# Patient Record
Sex: FEMALE | Race: WHITE | HISPANIC OR LATINO | URBAN - METROPOLITAN AREA
[De-identification: names, ages, dates, MRNs, and addresses within clinical notes are randomized per-mention and may not be internally consistent; named-entity substitution may affect disease eponyms.]

---

## 2017-04-07 ENCOUNTER — EMERGENCY (EMERGENCY)
Facility: HOSPITAL | Age: 47
LOS: 1 days | Discharge: HOME | End: 2017-04-07
Attending: EMERGENCY MEDICINE | Admitting: EMERGENCY MEDICINE
Payer: COMMERCIAL

## 2017-04-07 VITALS
HEART RATE: 90 BPM | WEIGHT: 147.05 LBS | DIASTOLIC BLOOD PRESSURE: 74 MMHG | RESPIRATION RATE: 14 BRPM | SYSTOLIC BLOOD PRESSURE: 127 MMHG | TEMPERATURE: 98 F | OXYGEN SATURATION: 100 %

## 2017-04-07 VITALS
OXYGEN SATURATION: 99 % | HEART RATE: 86 BPM | SYSTOLIC BLOOD PRESSURE: 130 MMHG | TEMPERATURE: 98 F | RESPIRATION RATE: 16 BRPM | DIASTOLIC BLOOD PRESSURE: 56 MMHG

## 2017-04-07 DIAGNOSIS — E03.9 HYPOTHYROIDISM, UNSPECIFIED: ICD-10-CM

## 2017-04-07 DIAGNOSIS — H92.03 OTALGIA, BILATERAL: ICD-10-CM

## 2017-04-07 DIAGNOSIS — Z87.440 PERSONAL HISTORY OF URINARY (TRACT) INFECTIONS: ICD-10-CM

## 2017-04-07 PROCEDURE — 70450 CT HEAD/BRAIN W/O DYE: CPT | Mod: 26

## 2017-04-07 PROCEDURE — 99284 EMERGENCY DEPT VISIT MOD MDM: CPT | Mod: 25

## 2017-04-07 PROCEDURE — 70486 CT MAXILLOFACIAL W/O DYE: CPT | Mod: 26

## 2017-04-07 PROCEDURE — 70450 CT HEAD/BRAIN W/O DYE: CPT

## 2017-04-07 PROCEDURE — 99284 EMERGENCY DEPT VISIT MOD MDM: CPT

## 2017-04-07 PROCEDURE — 70486 CT MAXILLOFACIAL W/O DYE: CPT

## 2017-04-07 RX ORDER — TRAMADOL HYDROCHLORIDE 50 MG/1
50 TABLET ORAL ONCE
Qty: 0 | Refills: 0 | Status: DISCONTINUED | OUTPATIENT
Start: 2017-04-07 | End: 2017-04-07

## 2017-04-07 RX ORDER — TRAMADOL HYDROCHLORIDE 50 MG/1
2 TABLET ORAL
Qty: 24 | Refills: 0 | OUTPATIENT
Start: 2017-04-07 | End: 2017-04-10

## 2017-04-07 RX ADMIN — TRAMADOL HYDROCHLORIDE 50 MILLIGRAM(S): 50 TABLET ORAL at 18:55

## 2017-04-07 NOTE — ED ADULT NURSE NOTE - OBJECTIVE STATEMENT
Presents to ER w c/o bilat ear pain x 4 weeks.  Pt states she developed the pain after inhaling bleach fumes.  Pt has been to many doctors and has been placed on steroids and antibx ear drops but states the pain never went away

## 2017-04-07 NOTE — ED PROVIDER NOTE - ENMT, MLM
Airway patent, Nasal mucosa clear. Mouth with normal mucosa. Throat has no vesicles, no oropharyngeal exudates and uvula is midline. tmj non tender Airway patent, Nasal mucosa clear. Mouth with normal mucosa. Throat has no vesicles, no oropharyngeal exudates and uvula is midline. tmj non tender. dentition wnl, no ttp or caries

## 2017-04-07 NOTE — ED PROVIDER NOTE - MEDICAL DECISION MAKING DETAILS
chronic b/l ear pain x 1 month failed multiple tx, seen by ent, ct with posterior superior impacted wisdom teeth ? source.  tramadol, fu ent to ? if teeth may be causing pain, if so, fu omfs for dental extraction.

## 2017-04-07 NOTE — ED PROVIDER NOTE - OBJECTIVE STATEMENT
Pt is a 45 yo female co bl ear pain for over a month. pt has been to ent and tx with abx without relief but sx persist. pt states began after exposure to clorox bleech. no hearing change drainage, ha. pt states pain at tmj region b/l as well

## 2017-04-07 NOTE — ED ADULT NURSE REASSESSMENT NOTE - NS ED NURSE REASSESS COMMENT FT1
46 year old female received from [facility]. Pt A+O x 4. Pt presented to the ED with chronic and persistent ear pain that radiated down the head/neck. Pt resting but seems slightly anxious. Awaiting CT/radiology.

## 2018-02-11 ENCOUNTER — EMERGENCY (EMERGENCY)
Facility: HOSPITAL | Age: 48
LOS: 1 days | Discharge: HOME | End: 2018-02-11
Attending: EMERGENCY MEDICINE | Admitting: EMERGENCY MEDICINE
Payer: MEDICAID

## 2018-02-11 VITALS
TEMPERATURE: 98 F | HEIGHT: 60 IN | HEART RATE: 87 BPM | SYSTOLIC BLOOD PRESSURE: 132 MMHG | WEIGHT: 149.03 LBS | RESPIRATION RATE: 16 BRPM | DIASTOLIC BLOOD PRESSURE: 83 MMHG | OXYGEN SATURATION: 99 %

## 2018-02-11 VITALS
HEART RATE: 75 BPM | OXYGEN SATURATION: 99 % | DIASTOLIC BLOOD PRESSURE: 70 MMHG | TEMPERATURE: 98 F | RESPIRATION RATE: 15 BRPM | SYSTOLIC BLOOD PRESSURE: 125 MMHG

## 2018-02-11 PROCEDURE — 96372 THER/PROPH/DIAG INJ SC/IM: CPT

## 2018-02-11 PROCEDURE — 72100 X-RAY EXAM L-S SPINE 2/3 VWS: CPT

## 2018-02-11 PROCEDURE — 99283 EMERGENCY DEPT VISIT LOW MDM: CPT | Mod: 25

## 2018-02-11 PROCEDURE — 99283 EMERGENCY DEPT VISIT LOW MDM: CPT

## 2018-02-11 PROCEDURE — 72100 X-RAY EXAM L-S SPINE 2/3 VWS: CPT | Mod: 26

## 2018-02-11 RX ORDER — LEVOTHYROXINE SODIUM 125 MCG
0 TABLET ORAL
Qty: 0 | Refills: 0 | COMMUNITY

## 2018-02-11 RX ORDER — OMEPRAZOLE 10 MG/1
0 CAPSULE, DELAYED RELEASE ORAL
Qty: 0 | Refills: 0 | COMMUNITY

## 2018-02-11 RX ORDER — LIDOCAINE 4 G/100G
1 CREAM TOPICAL ONCE
Qty: 0 | Refills: 0 | Status: COMPLETED | OUTPATIENT
Start: 2018-02-11 | End: 2018-02-11

## 2018-02-11 RX ORDER — CYCLOBENZAPRINE HYDROCHLORIDE 10 MG/1
1 TABLET, FILM COATED ORAL
Qty: 9 | Refills: 0 | OUTPATIENT
Start: 2018-02-11 | End: 2018-02-13

## 2018-02-11 RX ORDER — KETOROLAC TROMETHAMINE 30 MG/ML
60 SYRINGE (ML) INJECTION ONCE
Qty: 0 | Refills: 0 | Status: DISCONTINUED | OUTPATIENT
Start: 2018-02-11 | End: 2018-02-11

## 2018-02-11 RX ADMIN — Medication 60 MILLIGRAM(S): at 20:07

## 2018-02-11 RX ADMIN — Medication 60 MILLIGRAM(S): at 19:51

## 2018-02-11 RX ADMIN — LIDOCAINE 1 PATCH: 4 CREAM TOPICAL at 19:51

## 2018-02-11 NOTE — ED PROVIDER NOTE - PROGRESS NOTE DETAILS
patient resting comfortably, rx for flexeril and naproxen sent to pharmacy, given information for Dr Ambrocio,  xray lumbar spine normal.  recommended otc pain patches, ex, salon pas or [facility]ac[facility] for back pain

## 2018-02-11 NOTE — ED PROVIDER NOTE - OBJECTIVE STATEMENT
46 yo female presents with lower back pain radiating down right leg x 3 weeks, no trauma.  states 2-3 years ago she had a fall where she landed on her buttocks,  denies hx of fx.  nonsmoker. states she works as a , stands many hours a day.    PMD Dr Brito

## 2018-02-11 NOTE — ED PROVIDER NOTE - CHPI ED SYMPTOMS NEG
no motor function loss/no bowel dysfunction/no difficulty bearing weight/no bladder dysfunction/no neck tenderness/no numbness

## 2018-02-11 NOTE — ED ADULT NURSE NOTE - CHPI ED SYMPTOMS NEG
no difficulty bearing weight/no fever/no numbness/no deformity/no bruising/no stiffness/no tingling/no abrasion

## 2018-02-11 NOTE — ED PROVIDER NOTE - ATTENDING CONTRIBUTION TO CARE
I have personally performed a face to face diagnostic evaluation on this patient.  I have reviewed the PA note and agree with the history, exam, and plan of care, except as noted.  History and Exam by me shows 47 female c/o right sided low back pain, radiating to right thigh, for past 2-3 weeks, taking advil with little relief, denies fever, no bowel or bladder dysfunction, no weakness, (+)straight leg test of right leg with 30 degrees, able to ambulate, f/u xrays, pain control, f/u with orthopedics.

## 2018-02-26 ENCOUNTER — EMERGENCY (EMERGENCY)
Facility: HOSPITAL | Age: 48
LOS: 1 days | Discharge: HOME | End: 2018-02-26
Attending: EMERGENCY MEDICINE
Payer: MEDICAID

## 2018-02-26 VITALS
DIASTOLIC BLOOD PRESSURE: 77 MMHG | SYSTOLIC BLOOD PRESSURE: 118 MMHG | OXYGEN SATURATION: 98 % | RESPIRATION RATE: 18 BRPM | HEART RATE: 90 BPM | WEIGHT: 154.32 LBS | TEMPERATURE: 99 F

## 2018-02-26 PROCEDURE — 72131 CT LUMBAR SPINE W/O DYE: CPT | Mod: 26

## 2018-02-26 PROCEDURE — 99284 EMERGENCY DEPT VISIT MOD MDM: CPT | Mod: 25

## 2018-02-26 PROCEDURE — 72131 CT LUMBAR SPINE W/O DYE: CPT

## 2018-02-26 PROCEDURE — 99284 EMERGENCY DEPT VISIT MOD MDM: CPT

## 2018-02-26 PROCEDURE — 96372 THER/PROPH/DIAG INJ SC/IM: CPT

## 2018-02-26 RX ORDER — KETOROLAC TROMETHAMINE 30 MG/ML
60 SYRINGE (ML) INJECTION ONCE
Qty: 0 | Refills: 0 | Status: DISCONTINUED | OUTPATIENT
Start: 2018-02-26 | End: 2018-02-26

## 2018-02-26 RX ADMIN — Medication 60 MILLIGRAM(S): at 12:05

## 2018-02-26 RX ADMIN — Medication 60 MILLIGRAM(S): at 11:35

## 2018-02-26 NOTE — ED PROVIDER NOTE - MEDICAL DECISION MAKING DETAILS
47 female with lumbrosacral back pain, radiating to right leg, xrays negative, f/u ct scan, pain control

## 2018-02-26 NOTE — ED PROVIDER NOTE - PROGRESS NOTE DETAILS
patient feeling better, able to ambulate with out assistance, ct results discussed, given number for orthopedic spine [phone] to call and make appoinment, understands to return to ER for loss of bowel or bladder dysfunction, fever or worsening of symptoms

## 2018-02-26 NOTE — ED PROVIDER NOTE - OBJECTIVE STATEMENT
47 female presents to ER c/o lower back pain for the past 2 weeks, radiating to the sacrum, states she fell 2 years ago down stairs landing on her buttocks. 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 Patient came to ER 2 weeks ago, had xrays of lumbar spine was negative, given flexeril and naproxen, followed up with her PMD and given more naproxen but states it is not helping, has appointment to see orthopedics in April. Denies fever, no bowel or bladder dysfunction.

## 2019-02-20 ENCOUNTER — INPATIENT (INPATIENT)
Facility: HOSPITAL | Age: 49
LOS: 2 days | Discharge: HOME | DRG: 419 | End: 2019-02-23
Attending: SPECIALIST | Admitting: SPECIALIST
Payer: COMMERCIAL

## 2019-02-20 VITALS
TEMPERATURE: 98 F | SYSTOLIC BLOOD PRESSURE: 134 MMHG | WEIGHT: 156.97 LBS | DIASTOLIC BLOOD PRESSURE: 83 MMHG | OXYGEN SATURATION: 99 % | HEART RATE: 98 BPM | RESPIRATION RATE: 18 BRPM

## 2019-02-20 DIAGNOSIS — K80.71 CALCULUS OF GALLBLADDER AND BILE DUCT WITHOUT CHOLECYSTITIS WITH OBSTRUCTION: ICD-10-CM

## 2019-02-20 DIAGNOSIS — K80.20 CALCULUS OF GALLBLADDER WITHOUT CHOLECYSTITIS WITHOUT OBSTRUCTION: ICD-10-CM

## 2019-02-20 LAB
ALBUMIN SERPL ELPH-MCNC: 3.9 G/DL — SIGNIFICANT CHANGE UP (ref 3.3–5)
ALP SERPL-CCNC: 171 U/L — HIGH (ref 40–120)
ALT FLD-CCNC: 90 U/L — HIGH (ref 12–78)
ANION GAP SERPL CALC-SCNC: 6 MMOL/L — SIGNIFICANT CHANGE UP (ref 5–17)
APPEARANCE UR: CLEAR — SIGNIFICANT CHANGE UP
AST SERPL-CCNC: 60 U/L — HIGH (ref 15–37)
BASOPHILS # BLD AUTO: 0.03 K/UL — SIGNIFICANT CHANGE UP (ref 0–0.2)
BASOPHILS NFR BLD AUTO: 0.4 % — SIGNIFICANT CHANGE UP (ref 0–2)
BILIRUB SERPL-MCNC: 0.6 MG/DL — SIGNIFICANT CHANGE UP (ref 0.2–1.2)
BILIRUB UR-MCNC: NEGATIVE — SIGNIFICANT CHANGE UP
BUN SERPL-MCNC: 13 MG/DL — SIGNIFICANT CHANGE UP (ref 7–23)
CALCIUM SERPL-MCNC: 9 MG/DL — SIGNIFICANT CHANGE UP (ref 8.5–10.1)
CHLORIDE SERPL-SCNC: 105 MMOL/L — SIGNIFICANT CHANGE UP (ref 96–108)
CO2 SERPL-SCNC: 29 MMOL/L — SIGNIFICANT CHANGE UP (ref 22–31)
COLOR SPEC: YELLOW — SIGNIFICANT CHANGE UP
CREAT SERPL-MCNC: 0.58 MG/DL — SIGNIFICANT CHANGE UP (ref 0.5–1.3)
DIFF PNL FLD: ABNORMAL
EOSINOPHIL # BLD AUTO: 0.08 K/UL — SIGNIFICANT CHANGE UP (ref 0–0.5)
EOSINOPHIL NFR BLD AUTO: 1 % — SIGNIFICANT CHANGE UP (ref 0–6)
GLUCOSE SERPL-MCNC: 113 MG/DL — HIGH (ref 70–99)
GLUCOSE UR QL: NEGATIVE — SIGNIFICANT CHANGE UP
HCT VFR BLD CALC: 40.7 % — SIGNIFICANT CHANGE UP (ref 34.5–45)
HGB BLD-MCNC: 13.5 G/DL — SIGNIFICANT CHANGE UP (ref 11.5–15.5)
IMM GRANULOCYTES NFR BLD AUTO: 0.4 % — SIGNIFICANT CHANGE UP (ref 0–1.5)
KETONES UR-MCNC: NEGATIVE — SIGNIFICANT CHANGE UP
LEUKOCYTE ESTERASE UR-ACNC: NEGATIVE — SIGNIFICANT CHANGE UP
LYMPHOCYTES # BLD AUTO: 1.54 K/UL — SIGNIFICANT CHANGE UP (ref 1–3.3)
LYMPHOCYTES # BLD AUTO: 18.6 % — SIGNIFICANT CHANGE UP (ref 13–44)
MCHC RBC-ENTMCNC: 28.4 PG — SIGNIFICANT CHANGE UP (ref 27–34)
MCHC RBC-ENTMCNC: 33.2 GM/DL — SIGNIFICANT CHANGE UP (ref 32–36)
MCV RBC AUTO: 85.5 FL — SIGNIFICANT CHANGE UP (ref 80–100)
MONOCYTES # BLD AUTO: 0.47 K/UL — SIGNIFICANT CHANGE UP (ref 0–0.9)
MONOCYTES NFR BLD AUTO: 5.7 % — SIGNIFICANT CHANGE UP (ref 2–14)
NEUTROPHILS # BLD AUTO: 6.14 K/UL — SIGNIFICANT CHANGE UP (ref 1.8–7.4)
NEUTROPHILS NFR BLD AUTO: 73.9 % — SIGNIFICANT CHANGE UP (ref 43–77)
NITRITE UR-MCNC: NEGATIVE — SIGNIFICANT CHANGE UP
NRBC # BLD: 0 /100 WBCS — SIGNIFICANT CHANGE UP (ref 0–0)
PH UR: 5 — SIGNIFICANT CHANGE UP (ref 5–8)
PLATELET # BLD AUTO: 320 K/UL — SIGNIFICANT CHANGE UP (ref 150–400)
POTASSIUM SERPL-MCNC: 3.3 MMOL/L — LOW (ref 3.5–5.3)
POTASSIUM SERPL-SCNC: 3.3 MMOL/L — LOW (ref 3.5–5.3)
PROT SERPL-MCNC: 7.6 G/DL — SIGNIFICANT CHANGE UP (ref 6–8.3)
PROT UR-MCNC: NEGATIVE — SIGNIFICANT CHANGE UP
RBC # BLD: 4.76 M/UL — SIGNIFICANT CHANGE UP (ref 3.8–5.2)
RBC # FLD: 12.9 % — SIGNIFICANT CHANGE UP (ref 10.3–14.5)
SODIUM SERPL-SCNC: 140 MMOL/L — SIGNIFICANT CHANGE UP (ref 135–145)
SP GR SPEC: 1.01 — SIGNIFICANT CHANGE UP (ref 1.01–1.02)
UROBILINOGEN FLD QL: NEGATIVE — SIGNIFICANT CHANGE UP
WBC # BLD: 8.29 K/UL — SIGNIFICANT CHANGE UP (ref 3.8–10.5)
WBC # FLD AUTO: 8.29 K/UL — SIGNIFICANT CHANGE UP (ref 3.8–10.5)

## 2019-02-20 PROCEDURE — 93010 ELECTROCARDIOGRAM REPORT: CPT

## 2019-02-20 PROCEDURE — 47562 LAPAROSCOPIC CHOLECYSTECTOMY: CPT | Mod: AS,22

## 2019-02-20 PROCEDURE — 74176 CT ABD & PELVIS W/O CONTRAST: CPT | Mod: 26

## 2019-02-20 PROCEDURE — 76705 ECHO EXAM OF ABDOMEN: CPT | Mod: 26

## 2019-02-20 PROCEDURE — 99285 EMERGENCY DEPT VISIT HI MDM: CPT

## 2019-02-20 RX ORDER — PIPERACILLIN AND TAZOBACTAM 4; .5 G/20ML; G/20ML
3.38 INJECTION, POWDER, LYOPHILIZED, FOR SOLUTION INTRAVENOUS ONCE
Qty: 0 | Refills: 0 | Status: DISCONTINUED | OUTPATIENT
Start: 2019-02-20 | End: 2019-02-22

## 2019-02-20 RX ORDER — FERROUS SULFATE 325(65) MG
1 TABLET ORAL
Qty: 0 | Refills: 0 | COMMUNITY

## 2019-02-20 RX ORDER — SODIUM CHLORIDE 9 MG/ML
1000 INJECTION, SOLUTION INTRAVENOUS
Qty: 0 | Refills: 0 | Status: DISCONTINUED | OUTPATIENT
Start: 2019-02-20 | End: 2019-02-22

## 2019-02-20 RX ORDER — HYDROMORPHONE HYDROCHLORIDE 2 MG/ML
0.5 INJECTION INTRAMUSCULAR; INTRAVENOUS; SUBCUTANEOUS EVERY 6 HOURS
Qty: 0 | Refills: 0 | Status: DISCONTINUED | OUTPATIENT
Start: 2019-02-20 | End: 2019-02-22

## 2019-02-20 RX ORDER — LEVOTHYROXINE SODIUM 125 MCG
1 TABLET ORAL
Qty: 0 | Refills: 0 | COMMUNITY

## 2019-02-20 RX ORDER — FAMOTIDINE 10 MG/ML
20 INJECTION INTRAVENOUS
Qty: 0 | Refills: 0 | Status: DISCONTINUED | OUTPATIENT
Start: 2019-02-20 | End: 2019-02-22

## 2019-02-20 RX ORDER — SODIUM CHLORIDE 9 MG/ML
1000 INJECTION INTRAMUSCULAR; INTRAVENOUS; SUBCUTANEOUS ONCE
Qty: 0 | Refills: 0 | Status: COMPLETED | OUTPATIENT
Start: 2019-02-20 | End: 2019-02-20

## 2019-02-20 RX ORDER — PIPERACILLIN AND TAZOBACTAM 4; .5 G/20ML; G/20ML
3.38 INJECTION, POWDER, LYOPHILIZED, FOR SOLUTION INTRAVENOUS EVERY 8 HOURS
Qty: 0 | Refills: 0 | Status: DISCONTINUED | OUTPATIENT
Start: 2019-02-21 | End: 2019-02-22

## 2019-02-20 RX ORDER — ACETAMINOPHEN 500 MG
650 TABLET ORAL EVERY 6 HOURS
Qty: 0 | Refills: 0 | Status: DISCONTINUED | OUTPATIENT
Start: 2019-02-20 | End: 2019-02-22

## 2019-02-20 RX ORDER — LEVOTHYROXINE SODIUM 125 MCG
25 TABLET ORAL DAILY
Qty: 0 | Refills: 0 | Status: DISCONTINUED | OUTPATIENT
Start: 2019-02-20 | End: 2019-02-22

## 2019-02-20 RX ORDER — ONDANSETRON 8 MG/1
4 TABLET, FILM COATED ORAL EVERY 6 HOURS
Qty: 0 | Refills: 0 | Status: DISCONTINUED | OUTPATIENT
Start: 2019-02-20 | End: 2019-02-22

## 2019-02-20 RX ORDER — POTASSIUM CHLORIDE 20 MEQ
20 PACKET (EA) ORAL ONCE
Qty: 0 | Refills: 0 | Status: COMPLETED | OUTPATIENT
Start: 2019-02-20 | End: 2019-02-20

## 2019-02-20 RX ADMIN — SODIUM CHLORIDE 1000 MILLILITER(S): 9 INJECTION INTRAMUSCULAR; INTRAVENOUS; SUBCUTANEOUS at 17:41

## 2019-02-20 RX ADMIN — HYDROMORPHONE HYDROCHLORIDE 0.5 MILLIGRAM(S): 2 INJECTION INTRAMUSCULAR; INTRAVENOUS; SUBCUTANEOUS at 23:44

## 2019-02-20 RX ADMIN — Medication 20 MILLIEQUIVALENT(S): at 19:24

## 2019-02-20 RX ADMIN — SODIUM CHLORIDE 1000 MILLILITER(S): 9 INJECTION INTRAMUSCULAR; INTRAVENOUS; SUBCUTANEOUS at 19:12

## 2019-02-20 NOTE — CONSULT NOTE ADULT - SUBJECTIVE AND OBJECTIVE BOX
Chief Complaint:  Patient is a 48y old  Female who presents with a chief complaint of RUQ abdominal pain radiating to the back 47 yo  female with PMHX of hypothyroidism , acid reflux and hyperlipidemia presented to the ED with RUQ pain radiating to the back. patients reports prior episode of such pain, non food related, past wk, with no elevating or aggregating factors. the pain started as a "gas bubbling in my belly " yesterday with increase in intensity and radiation to the back. patient reports the pain to be 3/10. no fever, chills, N/V change of bowel function, urgency, hesitancy  or recent travel noted         Review of Systems:  Other Review of Systems: All other review of systems negative, except as noted in HPI 	     Review of Systems:  · Negative Gastrointestinal Symptoms	no nausea; no vomiting; no diarrhea	  · Gastrointestinal Symptoms	abdominal pain; radiating to back	  now with ruq pain as well    Allergies:  No Known Allergies      Medications:  HYDROmorphone  Injectable 0.5 milliGRAM(s) IV Push every 6 hours PRN      PMHX/PSHX:  Hyperlipidemia  Hypothyroid  Urinary tract infection  No pertinent past medical history  No significant past surgical history      Family history:  No pertinent family history in first degree relatives      Social History:   lives at home no etoh no cigs no ivda   ROS:     General:  No wt loss, fevers, chills, night sweats, fatigue,   Eyes:  Good vision, no reported pain  ENT:  No sore throat, pain, runny nose, dysphagia  CV:  No pain, palpitations, hypo/hypertension  Resp:  No dyspnea, cough, tachypnea, wheezing  GI:  No pain, No nausea, No vomiting, No diarrhea, No constipation, No weight loss, No fever, No pruritis, No rectal bleeding, No tarry stools, No dysphagia,  :  No pain, bleeding, incontinence, nocturia  Muscle:  No pain, weakness  Neuro:  No weakness, tingling, memory problems  Psych:  No fatigue, insomnia, mood problems, depression  Endocrine:  No polyuria, polydipsia, cold/heat intolerance  Heme:  No petechiae, ecchymosis, easy bruisability  Skin:  No rash, tattoos, scars, edema      PHYSICAL EXAM:   Vital Signs:  Vital Signs Last 24 Hrs  T(C): 36.6 (2019 19:42), Max: 36.7 (2019 17:07)  T(F): 97.9 (2019 19:42), Max: 98.1 (2019 17:07)  HR: 90 (2019 19:42) (90 - 98)  BP: 126/76 (2019 19:42) (126/76 - 134/83)  BP(mean): --  RR: 16 (2019 19:42) (16 - 18)  SpO2: 100% (2019 19:42) (99% - 100%)  Daily     Daily     GENERAL:  Appears stated age, well-groomed, well-nourished, no distress  HEENT:  NC/AT,  conjunctivae clear and pink, no thyromegaly, nodules, adenopathy, no JVD, sclera -anicteric  CHEST:  Full & symmetric excursion, no increased effort, breath sounds clear  HEART:  Regular rhythm, S1, S2, no murmur/rub/S3/S4, no abdominal bruit, no edema  ABDOMEN:  Soft, non-tender, non-distended, normoactive bowel sounds,  no masses ,no hepato-splenomegaly, no signs of chronic liver disease  EXTEREMITIES:  no cyanosis,clubbing or edema  SKIN:  No rash/erythema/ecchymoses/petechiae/wounds/abscess/warm/dry  NEURO:  Alert, oriented, no asterixis, no tremor, no encephalopathy    LABS:                        13.5   8.29  )-----------( 320      ( 2019 17:35 )             40.7     02-20    140  |  105  |  13  ----------------------------<  113<H>  3.3<L>   |  29  |  0.58    Ca    9.0      2019 17:35    TPro  7.6  /  Alb  3.9  /  TBili  0.6  /  DBili  x   /  AST  60<H>  /  ALT  90<H>  /  AlkPhos  171<H>  20    LIVER FUNCTIONS - ( 2019 17:35 )  Alb: 3.9 g/dL / Pro: 7.6 g/dL / ALK PHOS: 171 U/L / ALT: 90 U/L / AST: 60 U/L / GGT: x             Urinalysis Basic - ( 2019 20:41 )    Color: Yellow / Appearance: Clear / S.015 / pH: x  Gluc: x / Ketone: Negative  / Bili: Negative / Urobili: Negative   Blood: x / Protein: Negative / Nitrite: Negative   Leuk Esterase: Negative / RBC: 0-2 /HPF / WBC 0-2   Sq Epi: x / Non Sq Epi: Occasional / Bacteria: Occasional      Amylase [id number]      Lipase [id number]       Ammonia--      Imaging:

## 2019-02-20 NOTE — ED ADULT NURSE NOTE - OBJECTIVE STATEMENT
pt with complaints of right sided abdominal pain that comes and goes since yesterday. pt denies nausea, vomiting or diarrhea. pt was able to eat lunch today at 1530, but felt a fullness after eating .

## 2019-02-20 NOTE — H&P ADULT - HISTORY OF PRESENT ILLNESS
49 yo  female with PMHX of hypothyroidism, an hyperlipidemia presented deysi the ED with RUQ pain radiading to the back. patients reports prior episode of such pain, non food related, past wk, with no elevating or aggregating factors. the pain  started as a "gas bubbling in my belly " yesterday with increase in intensity and radiation to the back. patient reports the painas 3/10 47 yo  female with PMHX of hypothyroidism , acid reflux and hyperlipidemia presented to the ED with RUQ pain radiating to the back. patients reports prior episode of such pain, non food related, past wk, with no elevating or aggregating factors. the pain started as a "gas bubbling in my belly " yesterday with increase in intensity and radiation to the back. patient reports the pain to be 3/10. no fever, chills, N/V change of bowel function, urgency, hesitancy  or recent travel noted

## 2019-02-20 NOTE — H&P ADULT - GASTROINTESTINAL DETAILS
no masses palpable/nontender/no rebound tenderness/no rigidity/no Gonzalez sign elicited./bowel sounds normal/no guarding

## 2019-02-20 NOTE — ED PROVIDER NOTE - CHPI ED SYMPTOMS NEG
no chills/no blood in stool/no hematuria/no vomiting/no burning urination/no diarrhea/no nausea/no dysuria/no fever/no abdominal distension

## 2019-02-20 NOTE — ED PROVIDER NOTE - ATTENDING CONTRIBUTION TO CARE
47 yo F p/w R Sided abd pain x past ~ 2 days . Pos nausea, no diarrhea. no cp/sob/palp. no weak/ dizzy / malaise. no other abd pain. no weakness / dizzy. some dec appetite. no agg/allev factors. no recent travel / sick contacts. no other co.  Exam : MM moist. non-toxic, well appearing. nl card / resp exam. Pos tend R mid / RUQ abd. No venecia / guard. no hsm. no cvat.  US with cholelithiaasis, pt with persistent sx - IVF, surg, admit

## 2019-02-20 NOTE — ED PROVIDER NOTE - OBJECTIVE STATEMENT
47 y/o female presents to ED c/o right sided abdominal pain x 2 days. Rates pain 5/10, no radiation of pain, gradual onset, no qualifying factors. Pt states she is postmenopausal and also had a HYST in the past. LBM today. Denies any other complaints. States she otherwise feels good. Denies n/v, f/c, chest pain, sob, numbness, tingling, melena, hematochezia, diarrhea, vaginal discharge.

## 2019-02-20 NOTE — ED PROVIDER NOTE - PROGRESS NOTE DETAILS
spoke with gen surg dr foster who advised call back when us gallbladder results are back. spoke with general surgery PA who is here to see pt. pain medication deferred by pt

## 2019-02-21 ENCOUNTER — TRANSCRIPTION ENCOUNTER (OUTPATIENT)
Age: 49
End: 2019-02-21

## 2019-02-21 DIAGNOSIS — K80.20 CALCULUS OF GALLBLADDER WITHOUT CHOLECYSTITIS WITHOUT OBSTRUCTION: ICD-10-CM

## 2019-02-21 PROBLEM — E78.5 HYPERLIPIDEMIA, UNSPECIFIED: Chronic | Status: ACTIVE | Noted: 2018-02-11

## 2019-02-21 PROBLEM — E03.9 HYPOTHYROIDISM, UNSPECIFIED: Chronic | Status: ACTIVE | Noted: 2017-04-07

## 2019-02-21 LAB
ALBUMIN SERPL ELPH-MCNC: 3.3 G/DL — SIGNIFICANT CHANGE UP (ref 3.3–5)
ALP SERPL-CCNC: 151 U/L — HIGH (ref 40–120)
ALT FLD-CCNC: 78 U/L — SIGNIFICANT CHANGE UP (ref 12–78)
ANION GAP SERPL CALC-SCNC: 4 MMOL/L — LOW (ref 5–17)
APTT BLD: 31.5 SEC — SIGNIFICANT CHANGE UP (ref 28.5–37)
AST SERPL-CCNC: 44 U/L — HIGH (ref 15–37)
BASOPHILS # BLD AUTO: 0.02 K/UL — SIGNIFICANT CHANGE UP (ref 0–0.2)
BASOPHILS NFR BLD AUTO: 0.3 % — SIGNIFICANT CHANGE UP (ref 0–2)
BILIRUB SERPL-MCNC: 0.8 MG/DL — SIGNIFICANT CHANGE UP (ref 0.2–1.2)
BUN SERPL-MCNC: 10 MG/DL — SIGNIFICANT CHANGE UP (ref 7–23)
CALCIUM SERPL-MCNC: 8.9 MG/DL — SIGNIFICANT CHANGE UP (ref 8.5–10.1)
CHLORIDE SERPL-SCNC: 108 MMOL/L — SIGNIFICANT CHANGE UP (ref 96–108)
CO2 SERPL-SCNC: 31 MMOL/L — SIGNIFICANT CHANGE UP (ref 22–31)
CREAT SERPL-MCNC: 0.51 MG/DL — SIGNIFICANT CHANGE UP (ref 0.5–1.3)
CULTURE RESULTS: NO GROWTH — SIGNIFICANT CHANGE UP
EOSINOPHIL # BLD AUTO: 0.08 K/UL — SIGNIFICANT CHANGE UP (ref 0–0.5)
EOSINOPHIL NFR BLD AUTO: 1.2 % — SIGNIFICANT CHANGE UP (ref 0–6)
GLUCOSE SERPL-MCNC: 92 MG/DL — SIGNIFICANT CHANGE UP (ref 70–99)
HAV IGM SER-ACNC: SIGNIFICANT CHANGE UP
HBV CORE IGM SER-ACNC: SIGNIFICANT CHANGE UP
HBV SURFACE AG SER-ACNC: SIGNIFICANT CHANGE UP
HCG UR QL: NEGATIVE — SIGNIFICANT CHANGE UP
HCT VFR BLD CALC: 37.7 % — SIGNIFICANT CHANGE UP (ref 34.5–45)
HCV AB S/CO SERPL IA: 0.06 S/CO — SIGNIFICANT CHANGE UP (ref 0–0.79)
HCV AB SERPL-IMP: SIGNIFICANT CHANGE UP
HGB BLD-MCNC: 12.4 G/DL — SIGNIFICANT CHANGE UP (ref 11.5–15.5)
IMM GRANULOCYTES NFR BLD AUTO: 0.4 % — SIGNIFICANT CHANGE UP (ref 0–1.5)
INR BLD: 1.05 RATIO — SIGNIFICANT CHANGE UP (ref 0.88–1.16)
LYMPHOCYTES # BLD AUTO: 1.47 K/UL — SIGNIFICANT CHANGE UP (ref 1–3.3)
LYMPHOCYTES # BLD AUTO: 21.4 % — SIGNIFICANT CHANGE UP (ref 13–44)
MCHC RBC-ENTMCNC: 28.4 PG — SIGNIFICANT CHANGE UP (ref 27–34)
MCHC RBC-ENTMCNC: 32.9 GM/DL — SIGNIFICANT CHANGE UP (ref 32–36)
MCV RBC AUTO: 86.5 FL — SIGNIFICANT CHANGE UP (ref 80–100)
MONOCYTES # BLD AUTO: 0.52 K/UL — SIGNIFICANT CHANGE UP (ref 0–0.9)
MONOCYTES NFR BLD AUTO: 7.6 % — SIGNIFICANT CHANGE UP (ref 2–14)
NEUTROPHILS # BLD AUTO: 4.74 K/UL — SIGNIFICANT CHANGE UP (ref 1.8–7.4)
NEUTROPHILS NFR BLD AUTO: 69.1 % — SIGNIFICANT CHANGE UP (ref 43–77)
NRBC # BLD: 0 /100 WBCS — SIGNIFICANT CHANGE UP (ref 0–0)
PLATELET # BLD AUTO: 281 K/UL — SIGNIFICANT CHANGE UP (ref 150–400)
POTASSIUM SERPL-MCNC: 4.2 MMOL/L — SIGNIFICANT CHANGE UP (ref 3.5–5.3)
POTASSIUM SERPL-SCNC: 4.2 MMOL/L — SIGNIFICANT CHANGE UP (ref 3.5–5.3)
PROT SERPL-MCNC: 6.6 G/DL — SIGNIFICANT CHANGE UP (ref 6–8.3)
PROTHROM AB SERPL-ACNC: 12 SEC — SIGNIFICANT CHANGE UP (ref 10–12.9)
RBC # BLD: 4.36 M/UL — SIGNIFICANT CHANGE UP (ref 3.8–5.2)
RBC # FLD: 13.2 % — SIGNIFICANT CHANGE UP (ref 10.3–14.5)
SODIUM SERPL-SCNC: 143 MMOL/L — SIGNIFICANT CHANGE UP (ref 135–145)
SPECIMEN SOURCE: SIGNIFICANT CHANGE UP
WBC # BLD: 6.86 K/UL — SIGNIFICANT CHANGE UP (ref 3.8–10.5)
WBC # FLD AUTO: 6.86 K/UL — SIGNIFICANT CHANGE UP (ref 3.8–10.5)

## 2019-02-21 PROCEDURE — 74183 MRI ABD W/O CNTR FLWD CNTR: CPT | Mod: 26

## 2019-02-21 RX ADMIN — PIPERACILLIN AND TAZOBACTAM 25 GRAM(S): 4; .5 INJECTION, POWDER, LYOPHILIZED, FOR SOLUTION INTRAVENOUS at 05:35

## 2019-02-21 RX ADMIN — Medication 650 MILLIGRAM(S): at 14:20

## 2019-02-21 RX ADMIN — Medication 650 MILLIGRAM(S): at 22:30

## 2019-02-21 RX ADMIN — FAMOTIDINE 20 MILLIGRAM(S): 10 INJECTION INTRAVENOUS at 05:33

## 2019-02-21 RX ADMIN — Medication 650 MILLIGRAM(S): at 21:36

## 2019-02-21 RX ADMIN — FAMOTIDINE 20 MILLIGRAM(S): 10 INJECTION INTRAVENOUS at 18:25

## 2019-02-21 RX ADMIN — PIPERACILLIN AND TAZOBACTAM 25 GRAM(S): 4; .5 INJECTION, POWDER, LYOPHILIZED, FOR SOLUTION INTRAVENOUS at 13:16

## 2019-02-21 RX ADMIN — Medication 25 MICROGRAM(S): at 05:33

## 2019-02-21 RX ADMIN — Medication 650 MILLIGRAM(S): at 13:23

## 2019-02-21 RX ADMIN — PIPERACILLIN AND TAZOBACTAM 25 GRAM(S): 4; .5 INJECTION, POWDER, LYOPHILIZED, FOR SOLUTION INTRAVENOUS at 21:35

## 2019-02-21 RX ADMIN — HYDROMORPHONE HYDROCHLORIDE 0.5 MILLIGRAM(S): 2 INJECTION INTRAMUSCULAR; INTRAVENOUS; SUBCUTANEOUS at 00:30

## 2019-02-21 NOTE — DISCHARGE NOTE ADULT - PATIENT PORTAL LINK FT
You can access the [facility] Portal, offered by [facility], by registering with the following website: [email or web address]

## 2019-02-21 NOTE — PROGRESS NOTE ADULT - SUBJECTIVE AND OBJECTIVE BOX
INTERVAL HPI/OVERNIGHT EVENTS:  pt seen and examined  c/o abd pain  per overnight rn  medicated for abd pain x1 overnight, no n/v/d  afebrile overnight labs noted    MEDICATIONS  (STANDING):  famotidine    Tablet 20 milliGRAM(s) Oral two times a day  lactated ringers. 1000 milliLiter(s) (100 mL/Hr) IV Continuous <Continuous>  levothyroxine 25 MICROGram(s) Oral daily  piperacillin/tazobactam IVPB. 3.375 Gram(s) IV Intermittent once  piperacillin/tazobactam IVPB. 3.375 Gram(s) IV Intermittent every 8 hours    MEDICATIONS  (PRN):  acetaminophen   Tablet .. 650 milliGRAM(s) Oral every 6 hours PRN Temp greater or equal to 38.5C (101.3F), Mild Pain (1 - 3)  HYDROmorphone  Injectable 0.5 milliGRAM(s) IV Push every 6 hours PRN Severe Pain (7 - 10)  ondansetron Injectable 4 milliGRAM(s) IV Push every 6 hours PRN Nausea      Allergies    No Known Allergies    Intolerances        Review of Systems:    General:  No wt loss, fevers, chills, night sweats, fatigue   Eyes:  Good vision, no reported pain  ENT:  No sore throat, pain, runny nose, dysphagia  CV:  No pain, palpitations, hypo/hypertension  Resp:  No dyspnea, cough, tachypnea, wheezing  GI:  +pain, No nausea, No vomiting, No diarrhea, No constipation, No weight loss, No fever, No pruritis, No rectal bleeding, No melena, No dysphagia  :  No pain, bleeding, incontinence, nocturia  Muscle:  No pain, weakness  Neuro:  No weakness, tingling, memory problems  Psych:  No fatigue, insomnia, mood problems, depression  Endocrine:  No polyuria, polydypsia, cold/heat intolerance  Heme:  No petechiae, ecchymosis, easy bruisability  Skin:  No rash, tattoos, scars, edema      Vital Signs Last 24 Hrs  T(C): 36.7 (2019 07:41), Max: 36.8 (2019 21:47)  T(F): 98 (2019 07:41), Max: 98.2 (2019 21:47)  HR: 70 (2019 07:41) (70 - 98)  BP: 119/79 (2019 07:41) (111/73 - 144/84)  BP(mean): --  RR: 16 (2019 07:41) (16 - 18)  SpO2: 98% (2019 07:41) (98% - 100%)    PHYSICAL EXAM:    Constitutional: NAD  HEENT: ncat  Neck: No LAD  Respiratory: dec bs  Cardiovascular: S1 and S2, RRR  Gastrointestinal: soft mild ttp ruq no guarding nd  Extremities: No peripheral edema  Vascular: 2+ peripheral pulses  Neurological: awake alert responds appropriately  Skin: No rashes      LABS:                        12.4   6.86  )-----------( 281      ( 2019 07:10 )             37.7     -    143  |  108  |  10  ----------------------------<  92  4.2   |  31  |  0.51    Ca    8.9      2019 07:10    TPro  6.6  /  Alb  3.3  /  TBili  0.8  /  DBili  x   /  AST  44<H>  /  ALT  78  /  AlkPhos  151<H>  02-      Urinalysis Basic - ( 2019 20:41 )    Color: Yellow / Appearance: Clear / S.015 / pH: x  Gluc: x / Ketone: Negative  / Bili: Negative / Urobili: Negative   Blood: x / Protein: Negative / Nitrite: Negative   Leuk Esterase: Negative / RBC: 0-2 /HPF / WBC 0-2   Sq Epi: x / Non Sq Epi: Occasional / Bacteria: Occasional        RADIOLOGY & ADDITIONAL TESTS:

## 2019-02-21 NOTE — DISCHARGE NOTE ADULT - CARE PLAN
Principal Discharge DX:	Cholelithiasis  Goal:	RECOVER FROM SURGERY  Assessment and plan of treatment:	NO HEAVY LIFTING  SHOWER'  AMBULATE AND KEEP HYDRATED  FOLLOW UP WITH DR. JOHNSON  IN ONE WEEK

## 2019-02-21 NOTE — DISCHARGE NOTE ADULT - REASON FOR ADMISSION
RUQ abdominal pain radiating to the back RIGHT UPPER ABDOMINAL PAIN  SYMPTOMATIC CHOLELITHIASIS  LAPAROSCOPIC CHOLECYSTECTOMY AND REPAIR UMBILICAL HERNIA

## 2019-02-21 NOTE — DISCHARGE NOTE ADULT - MEDICATION SUMMARY - MEDICATIONS TO TAKE
I will START or STAY ON the medications listed below when I get home from the hospital:    Tylenol Extra Strength 500 mg oral tablet  -- 2 tab(s) by mouth every 6 hours, As Needed  -- Indication: For MILD PAIN     Aleve 220 mg oral capsule  -- 1 cap(s) by mouth every 12 hours, As Needed  -- Indication: For MODERATE PAIN     oxyCODONE 5 mg oral tablet  -- 1 tab(s) by mouth every 6 hours, As Needed -for severe pain MDD:4   -- Caution federal law prohibits the transfer of this drug to any person other  than the person for whom it was prescribed.  It is very important that you take or use this exactly as directed.  Do not skip doses or discontinue unless directed by your doctor.  May cause drowsiness.  Alcohol may intensify this effect.  Use care when operating dangerous machinery.  This prescription cannot be refilled.  Using more of this medication than prescribed may cause serious breathing problems.    -- Indication: For SEVERE PAIN     levothyroxine 25 mcg (0.025 mg) oral tablet  -- 1 tab(s) by mouth once a day  -- Indication: For THYROID

## 2019-02-21 NOTE — DISCHARGE NOTE ADULT - CARE PROVIDER_API CALL
Forest Loya)  Surgery  [address], [address] [place] [id number]  Phone: [phone]  Fax: [phone]  Follow Up Time:

## 2019-02-21 NOTE — DISCHARGE NOTE ADULT - PLAN OF CARE
RECOVER FROM SURGERY NO HEAVY LIFTING  SHOWER'  AMBULATE AND KEEP HYDRATED  FOLLOW UP WITH DR. JOHNSON  IN ONE WEEK

## 2019-02-21 NOTE — PROGRESS NOTE ADULT - SUBJECTIVE AND OBJECTIVE BOX
CONI SORENSEN  MRN-[id number] 48y    GENERAL SURGERY/ DR. JOHNSON    NO FEVER, CHILLS, N/V  RUQ  AND EPIGASTRIC PAIN MUCH IMPROVED     Vital Signs Last 24 Hrs  T(C): 36.7 (20 Feb 2019 23:25), Max: 36.8 (20 Feb 2019 21:47)  T(F): 98.1 (20 Feb 2019 23:25), Max: 98.2 (20 Feb 2019 21:47)  HR: 79 (20 Feb 2019 23:25) (79 - 98)  BP: 111/73 (20 Feb 2019 23:25) (111/73 - 144/84)  BP(mean): --  RR: 16 (20 Feb 2019 23:25) (16 - 18)  SpO2: 98% (20 Feb 2019 23:25) (98% - 100%)      LUNGS: CLEAR TO AUSCULTATION , NO W/R/R  ABDOMEN: + BS, OBESE, SOFT, NON DISTENDED, MILD RUQ TENDERNESS ON DEEP PALPATION WITHOUT ANY PALPABLE MASS/ GUARDING/ RIGIDITY. NEGATIVE GONZALEZ'S SIGN     EXTREMITY: NO EDEMA, NO CALF TENDERNESS                            13.5   8.29  )-----------( 320      ( 20 Feb 2019 17:35 )             40.7      02-20    140  |  105  |  13  ----------------------------<  113<H>  3.3<L>   |  29  |  0.58    Ca    9.0      20 Feb 2019 17:35    TPro  7.6  /  Alb  3.9  /  TBili  0.6  /  DBili  x   /  AST  60<H>  /  ALT  90<H>  /  AlkPhos  171<H>  02-20    US Gallbladder (02.20.19 @ 19:00)                     INTERPRETATION:  Exam Date: 2/20/2019 7:00 PM  Clinical Information: Right upper quadrant pain  Technique: Gallbladder ultrasound was performed with comparison toCT   abdomen pelvis same day    Findings:    Large gallstone. No gallbladder wall thickening. No pericholecystic   fluid. No sonographic Gonzalez sign. No biliary dilatation. Hepatic   steatosis.    Impression:    Cholelithiasis without secondary signsof acute cholecystitis    WANDA PEÑA M.D., ATTENDING RADIOLOGIST                          ASSESSMENT &  PLAN    BILIARY COLIC   CHOLELITHIASIS    NPO  ZOSYN  IV HYDRATION  PAIN MANAGEMENT   HY[POKALEMIA POTASSIUM SUPPLEMENT GIVEN   GI CONSULT NOTED MRCP ORDERED   WILL FOLLOW UP CONI SORENSEN  MRN-[id number] 48y    GENERAL SURGERY/ DR. JOHNSON    NO FEVER, CHILLS, N/V  RUQ  AND EPIGASTRIC PAIN MUCH IMPROVED     Vital Signs Last 24 Hrs  T(C): 36.7 (20 Feb 2019 23:25), Max: 36.8 (20 Feb 2019 21:47)  T(F): 98.1 (20 Feb 2019 23:25), Max: 98.2 (20 Feb 2019 21:47)  HR: 79 (20 Feb 2019 23:25) (79 - 98)  BP: 111/73 (20 Feb 2019 23:25) (111/73 - 144/84)  BP(mean): --  RR: 16 (20 Feb 2019 23:25) (16 - 18)  SpO2: 98% (20 Feb 2019 23:25) (98% - 100%)      LUNGS: CLEAR TO AUSCULTATION , NO W/R/R  ABDOMEN: + BS, OBESE, SOFT, NON DISTENDED, MILD RUQ TENDERNESS ON DEEP PALPATION WITHOUT ANY PALPABLE MASS/ GUARDING/ RIGIDITY. NEGATIVE GONZALEZ'S SIGN     EXTREMITY: NO EDEMA, NO CALF TENDERNESS                            13.5   8.29  )-----------( 320      ( 20 Feb 2019 17:35 )             40.7      02-20    140  |  105  |  13  ----------------------------<  113<H>  3.3<L>   |  29  |  0.58    Ca    9.0      20 Feb 2019 17:35    TPro  7.6  /  Alb  3.9  /  TBili  0.6  /  DBili  x   /  AST  60<H>  /  ALT  90<H>  /  AlkPhos  171<H>  02-20    US Gallbladder (02.20.19 @ 19:00)                     INTERPRETATION:  Exam Date: 2/20/2019 7:00 PM  Clinical Information: Right upper quadrant pain  Technique: Gallbladder ultrasound was performed with comparison toCT   abdomen pelvis same day    Findings:    Large gallstone. No gallbladder wall thickening. No pericholecystic   fluid. No sonographic Gonzalez sign. No biliary dilatation. Hepatic   steatosis.    Impression:    Cholelithiasis without secondary signsof acute cholecystitis    WANDA PEÑA M.D., ATTENDING RADIOLOGIST                          ASSESSMENT &  PLAN    BILIARY COLIC   CHOLELITHIASIS    NPO  ZOSYN  IV HYDRATION  PAIN MANAGEMENT   HYPOKALEMIA POTASSIUM SUPPLEMENT GIVEN   GI CONSULT NOTED MRCP ORDERED   WILL FOLLOW UP  F/U LABS

## 2019-02-21 NOTE — DISCHARGE NOTE ADULT - HOSPITAL COURSE
THIS IS A CASE OF A 47 YO FEMALE EVALUATED IN THE ER FOR RIGHT UPPER ABDOMINAL PAIN AND ELEVATED ALK PHOS.     PAST MEDICAL HISTORY: HYPOTHYROIDISM     PATIENT ADMITTED FOR FURTHER WORK UP, SHE WAS STARTED ON IV ANTIBIOTICS. A MRCP WAS ORDERED AND CONFIRMED CHOLELITHIASIS WITH NORMAL CBD.      HOSPITAL COURSE: AFTER THE RISK AND BENEFITS OF SURGICAL INTERVENTION IN DETAILS WERE DISCUSSED WITH THE PATIENT, A CONSENT WAS OBTAINED. AFTER  PREOPERATIVE EVALUATION, THE PATIENT WAS TAKEN TO THE OPERATING ROOM ON 02/22/2015 AND THE PATIENT UNDERWENT A  LAPAROSCOPIC CHOLECYSTECTOMY AND UMBILICAL HERNIA REPAIR.     POSTOPERATIVE PHASE, THE PATIENT WAS STARTED ON REGULAR DIET. POST OP PAIN WAS MANAGED. SHE TOLERATED DIET.         DISPOSITION : HOME, FOLLOW UP WITH DR. JOHNSON IN ONE WEEK.

## 2019-02-21 NOTE — PROGRESS NOTE ADULT - PROBLEM SELECTOR PLAN 1
US showed gs wo signs of acute bud  lfts now downtrending  f/u mrcp  f/u acute hepatitis panel  npo/ivf, diet as per sx  abx   anti emetics prn  ppi ppx  trend lfts  avoid hepatotoxins  prn pain control  monitor abd exam  further recs pending above

## 2019-02-22 ENCOUNTER — RESULT REVIEW (OUTPATIENT)
Age: 49
End: 2019-02-22

## 2019-02-22 LAB
ALBUMIN SERPL ELPH-MCNC: 3.2 G/DL — LOW (ref 3.3–5)
ALP SERPL-CCNC: 136 U/L — HIGH (ref 40–120)
ALT FLD-CCNC: 72 U/L — SIGNIFICANT CHANGE UP (ref 12–78)
ANION GAP SERPL CALC-SCNC: 6 MMOL/L — SIGNIFICANT CHANGE UP (ref 5–17)
AST SERPL-CCNC: 38 U/L — HIGH (ref 15–37)
BILIRUB SERPL-MCNC: 0.9 MG/DL — SIGNIFICANT CHANGE UP (ref 0.2–1.2)
BUN SERPL-MCNC: 7 MG/DL — SIGNIFICANT CHANGE UP (ref 7–23)
CALCIUM SERPL-MCNC: 9 MG/DL — SIGNIFICANT CHANGE UP (ref 8.5–10.1)
CHLORIDE SERPL-SCNC: 107 MMOL/L — SIGNIFICANT CHANGE UP (ref 96–108)
CO2 SERPL-SCNC: 31 MMOL/L — SIGNIFICANT CHANGE UP (ref 22–31)
CREAT SERPL-MCNC: 0.61 MG/DL — SIGNIFICANT CHANGE UP (ref 0.5–1.3)
GLUCOSE SERPL-MCNC: 97 MG/DL — SIGNIFICANT CHANGE UP (ref 70–99)
POTASSIUM SERPL-MCNC: 3.9 MMOL/L — SIGNIFICANT CHANGE UP (ref 3.5–5.3)
POTASSIUM SERPL-SCNC: 3.9 MMOL/L — SIGNIFICANT CHANGE UP (ref 3.5–5.3)
PROT SERPL-MCNC: 6.6 G/DL — SIGNIFICANT CHANGE UP (ref 6–8.3)
SODIUM SERPL-SCNC: 144 MMOL/L — SIGNIFICANT CHANGE UP (ref 135–145)

## 2019-02-22 PROCEDURE — 47562 LAPAROSCOPIC CHOLECYSTECTOMY: CPT | Mod: 22

## 2019-02-22 PROCEDURE — 88304 TISSUE EXAM BY PATHOLOGIST: CPT | Mod: 26

## 2019-02-22 RX ORDER — SODIUM CHLORIDE 9 MG/ML
1000 INJECTION, SOLUTION INTRAVENOUS
Qty: 0 | Refills: 0 | Status: DISCONTINUED | OUTPATIENT
Start: 2019-02-22 | End: 2019-02-23

## 2019-02-22 RX ORDER — ENOXAPARIN SODIUM 100 MG/ML
40 INJECTION SUBCUTANEOUS DAILY
Qty: 0 | Refills: 0 | Status: DISCONTINUED | OUTPATIENT
Start: 2019-02-23 | End: 2019-02-23

## 2019-02-22 RX ORDER — LEVOTHYROXINE SODIUM 125 MCG
25 TABLET ORAL DAILY
Qty: 0 | Refills: 0 | Status: DISCONTINUED | OUTPATIENT
Start: 2019-02-22 | End: 2019-02-23

## 2019-02-22 RX ORDER — OXYCODONE AND ACETAMINOPHEN 5; 325 MG/1; MG/1
1 TABLET ORAL EVERY 4 HOURS
Qty: 0 | Refills: 0 | Status: DISCONTINUED | OUTPATIENT
Start: 2019-02-22 | End: 2019-02-23

## 2019-02-22 RX ORDER — OXYCODONE AND ACETAMINOPHEN 5; 325 MG/1; MG/1
2 TABLET ORAL EVERY 6 HOURS
Qty: 0 | Refills: 0 | Status: DISCONTINUED | OUTPATIENT
Start: 2019-02-22 | End: 2019-02-23

## 2019-02-22 RX ORDER — SODIUM CHLORIDE 9 MG/ML
1000 INJECTION, SOLUTION INTRAVENOUS
Qty: 0 | Refills: 0 | Status: DISCONTINUED | OUTPATIENT
Start: 2019-02-22 | End: 2019-02-22

## 2019-02-22 RX ORDER — ONDANSETRON 8 MG/1
4 TABLET, FILM COATED ORAL ONCE
Qty: 0 | Refills: 0 | Status: DISCONTINUED | OUTPATIENT
Start: 2019-02-22 | End: 2019-02-22

## 2019-02-22 RX ORDER — ONDANSETRON 8 MG/1
4 TABLET, FILM COATED ORAL EVERY 6 HOURS
Qty: 0 | Refills: 0 | Status: DISCONTINUED | OUTPATIENT
Start: 2019-02-22 | End: 2019-02-23

## 2019-02-22 RX ORDER — HYDROMORPHONE HYDROCHLORIDE 2 MG/ML
0.5 INJECTION INTRAMUSCULAR; INTRAVENOUS; SUBCUTANEOUS
Qty: 0 | Refills: 0 | Status: DISCONTINUED | OUTPATIENT
Start: 2019-02-22 | End: 2019-02-22

## 2019-02-22 RX ADMIN — PIPERACILLIN AND TAZOBACTAM 25 GRAM(S): 4; .5 INJECTION, POWDER, LYOPHILIZED, FOR SOLUTION INTRAVENOUS at 05:48

## 2019-02-22 RX ADMIN — ONDANSETRON 4 MILLIGRAM(S): 8 TABLET, FILM COATED ORAL at 18:05

## 2019-02-22 RX ADMIN — HYDROMORPHONE HYDROCHLORIDE 0.5 MILLIGRAM(S): 2 INJECTION INTRAMUSCULAR; INTRAVENOUS; SUBCUTANEOUS at 13:28

## 2019-02-22 RX ADMIN — OXYCODONE AND ACETAMINOPHEN 1 TABLET(S): 5; 325 TABLET ORAL at 18:06

## 2019-02-22 RX ADMIN — Medication 25 MICROGRAM(S): at 05:48

## 2019-02-22 RX ADMIN — HYDROMORPHONE HYDROCHLORIDE 0.5 MILLIGRAM(S): 2 INJECTION INTRAMUSCULAR; INTRAVENOUS; SUBCUTANEOUS at 13:51

## 2019-02-22 RX ADMIN — SODIUM CHLORIDE 100 MILLILITER(S): 9 INJECTION, SOLUTION INTRAVENOUS at 13:29

## 2019-02-22 RX ADMIN — HYDROMORPHONE HYDROCHLORIDE 0.5 MILLIGRAM(S): 2 INJECTION INTRAMUSCULAR; INTRAVENOUS; SUBCUTANEOUS at 13:41

## 2019-02-22 RX ADMIN — SODIUM CHLORIDE 100 MILLILITER(S): 9 INJECTION, SOLUTION INTRAVENOUS at 08:49

## 2019-02-22 RX ADMIN — FAMOTIDINE 20 MILLIGRAM(S): 10 INJECTION INTRAVENOUS at 05:48

## 2019-02-22 RX ADMIN — OXYCODONE AND ACETAMINOPHEN 1 TABLET(S): 5; 325 TABLET ORAL at 18:36

## 2019-02-22 RX ADMIN — OXYCODONE AND ACETAMINOPHEN 2 TABLET(S): 5; 325 TABLET ORAL at 22:32

## 2019-02-22 RX ADMIN — HYDROMORPHONE HYDROCHLORIDE 0.5 MILLIGRAM(S): 2 INJECTION INTRAMUSCULAR; INTRAVENOUS; SUBCUTANEOUS at 13:40

## 2019-02-22 NOTE — BRIEF OPERATIVE NOTE - PRE-OP DX
Cholecystitis with cholelithiasis  02/22/2019    Active  Love [employer]  Umbilical hernia  02/22/2019    Active  Love [employer]

## 2019-02-22 NOTE — PROGRESS NOTE ADULT - SUBJECTIVE AND OBJECTIVE BOX
INTERVAL HPI/OVERNIGHT EVENTS:  pt seen and examined  c/o mild abd pain and headache  +bm yesterday  per overnight rn received tylenol for headache, no c/o abd pain  afebrile overnight cmp noted    MEDICATIONS  (STANDING):  famotidine    Tablet 20 milliGRAM(s) Oral two times a day  lactated ringers. 1000 milliLiter(s) (100 mL/Hr) IV Continuous <Continuous>  levothyroxine 25 MICROGram(s) Oral daily  piperacillin/tazobactam IVPB. 3.375 Gram(s) IV Intermittent once  piperacillin/tazobactam IVPB. 3.375 Gram(s) IV Intermittent every 8 hours    MEDICATIONS  (PRN):  acetaminophen   Tablet .. 650 milliGRAM(s) Oral every 6 hours PRN Temp greater or equal to 38.5C (101.3F), Mild Pain (1 - 3)  HYDROmorphone  Injectable 0.5 milliGRAM(s) IV Push every 6 hours PRN Severe Pain (7 - 10)  ondansetron Injectable 4 milliGRAM(s) IV Push every 6 hours PRN Nausea      Allergies    No Known Allergies    Intolerances        Review of Systems:    General:  No wt loss, fevers, chills, night sweats, fatigue   Eyes:  Good vision, no reported pain  ENT:  No sore throat, pain, runny nose, dysphagia  CV:  No pain, palpitations, hypo/hypertension  Resp:  No dyspnea, cough, tachypnea, wheezing  GI:  +pain, No nausea, No vomiting, No diarrhea, No constipation, No weight loss, No fever, No pruritis, No rectal bleeding, No melena, No dysphagia  :  No pain, bleeding, incontinence, nocturia  Muscle:  No pain, weakness  Neuro:  headache  Psych:  No fatigue, insomnia, mood problems, depression  Endocrine:  No polyuria, polydypsia, cold/heat intolerance  Heme:  No petechiae, ecchymosis, easy bruisability  Skin:  No rash, tattoos, scars, edema      Vital Signs Last 24 Hrs  T(C): 36.6 (2019 08:25), Max: 36.6 (2019 17:42)  T(F): 97.9 (2019 08:25), Max: 97.9 (2019 00:22)  HR: 66 (2019 08:25) (66 - 68)  BP: 102/66 (2019 08:25) (97/63 - 124/80)  BP(mean): --  RR: 18 (2019 08:25) (18 - 18)  SpO2: 98% (2019 08:25) (98% - 99%)    PHYSICAL EXAM:  Constitutional: NAD  HEENT: ncat  Neck: No LAD  Respiratory: dec bs  Cardiovascular: S1 and S2, RRR  Gastrointestinal: soft mild ttp ruq no guarding nd  Extremities: No peripheral edema  Vascular: 2+ peripheral pulses  Neurological: awake alert responds appropriately  Skin: No rashes    LABS:                        12.4   6.86  )-----------( 281      ( 2019 07:10 )             37.7         144  |  107  |  7   ----------------------------<  97  3.9   |  31  |  0.61    Ca    9.0      2019 07:03    TPro  6.6  /  Alb  3.2<L>  /  TBili  0.9  /  DBili  .20  /  AST  38<H>  /  ALT  72  /  AlkPhos  136<H>      PT/INR - ( 2019 15:00 )   PT: 12.0 sec;   INR: 1.05 ratio         PTT - ( 2019 15:00 )  PTT:31.5 sec  Urinalysis Basic - ( 2019 20:41 )    Color: Yellow / Appearance: Clear / S.015 / pH: x  Gluc: x / Ketone: Negative  / Bili: Negative / Urobili: Negative   Blood: x / Protein: Negative / Nitrite: Negative   Leuk Esterase: Negative / RBC: 0-2 /HPF / WBC 0-2   Sq Epi: x / Non Sq Epi: Occasional / Bacteria: Occasional        RADIOLOGY & ADDITIONAL TESTS:  < from: MR MRCP w/wo IV Cont (19 @ 11:02) >    EXAM:  MR MRCP [facility] IC                            PROCEDURE DATE:  2019          INTERPRETATION:  History: Gallstones, elevated LFTs.      MRCP and abdominal MR multisequence. No exogenous contrast.    Gallbladder distended with a large stone.No wall thickening or   pericholecystic fluid. Common duct normal in caliber, measures 5 mm. No   common duct stone or stricture. No intrahepatic biliary dilatation.   Unenhanced liver spleen not remarkable. Unenhanced pancreas unremarkable.   No pancreatic ductal dilatation. No scan evidence of acute pancreatitis.   There is a tiny subcentimeter right renal cortical cyst.  No ascites.    Impression:    Cholelithiasis. No biliary dilatation or common duct stone.                VITO CROCKER M.D., ATTENDING RADIOLOGIST  This document has been electronically signed. 2019 11:33AM                < end of copied text >

## 2019-02-22 NOTE — BRIEF OPERATIVE NOTE - POST-OP DX
Cholecystitis with cholelithiasis  02/22/2019    Love Cobb  Umbilical hernia, incarcerated  02/22/2019    Love Cobb

## 2019-02-22 NOTE — PROGRESS NOTE ADULT - ATTENDING COMMENTS
Pt. was seen, discussed proposed treatment with the patient and her family Pt. was seen, discussed proposed treatment with the patient and her family.

## 2019-02-22 NOTE — PROGRESS NOTE ADULT - PROBLEM SELECTOR PLAN 1
mrcp neg for cbd stone/biliary dilatation, showed dt gb w large stone  lfts downtrending  f/u surgery recs, planned for OR today  npo/ivf  abx   cont pepcid  anti emetics prn  trend lfts  avoid hepatotoxins  prn pain control  monitor abd exam  will follow

## 2019-02-22 NOTE — PROGRESS NOTE ADULT - SUBJECTIVE AND OBJECTIVE BOX
CONI SORENSEN  MRN-[id number] 48y    GENERAL SURGERY/ DR. JOHNSON    NO FEVER, CHILLS,  N/V    TOLERATED CLEARS LAST PM  MILD RUQ AND MID EPIGASTRIC DISCOMFORT     Vital Signs Last 24 Hrs  T(C): 36.6 (22 Feb 2019 00:22), Max: 36.7 (21 Feb 2019 07:41)  T(F): 97.9 (22 Feb 2019 00:22), Max: 98 (21 Feb 2019 07:41)  HR: 68 (22 Feb 2019 00:22) (66 - 70)  BP: 97/63 (22 Feb 2019 00:22) (97/63 - 124/80)  BP(mean): --  RR: 18 (22 Feb 2019 00:22) (16 - 18)  SpO2: 98% (22 Feb 2019 00:22) (98% - 99%)    LUNGS: CLEAR TO AUSCULTATION , NO W/R/R  ABDOMEN: + BS, OBESE, SOFT, NON DISTENDED, MILD RUQ TENDERNESS ON DEEP PALPATION WITHOUT ANY PALPABLE MASS/ GUARDING/ RIGIDITY. NEGATIVE GREGORIO'S SIGN     EXTREMITY: NO EDEMA, NO CALF TENDERNESS                           12.4   6.86  )-----------( 281      ( 21 Feb 2019 07:10 )             37.7      02-21    143  |  108  |  10  ----------------------------<  92  4.2   |  31  |  0.51    Ca    8.9      21 Feb 2019 07:10    TPro  6.6  /  Alb  3.3  /  TBili  0.8  /  DBili  x   /  AST  44<H>  /  ALT  78  /  AlkPhos  151<H>  02-21    PT/INR - ( 21 Feb 2019 15:00 )   PT: 12.0 sec;   INR: 1.05 ratio    PTT - ( 21 Feb 2019 15:00 )  PTT:31.5 sec    Type + Screen (02.21.19 @ 15:00)    ABO RH Interpretation: A POS    MR MRCP w/wo IV Cont (02.21.19 @ 11:02)                    Gallbladder distended with a large stone.No wall thickening or   pericholecystic fluid. Common duct normal in caliber, measures 5 mm. No   common duct stone or stricture. No intrahepatic biliary dilatation.   Unenhanced liver spleen not remarkable. Unenhanced pancreas unremarkable.   No pancreatic ductal dilatation. No scan evidence of acute pancreatitis.   There is a tiny subcentimeter right renal cortical cyst.  No ascites.    Impression:    Cholelithiasis. No biliary dilatation or common duct stone.    VITO CROCKER M.D., ATTENDING RADIOLOGIST       ASSESSMENT &  PLAN    BILIARY COLIC   SYMPTOMATIC CHOLELITHIASIS    NPO  ZOSYN  MRCP NOTED  TO OR TODAY FOR LAPAROSCOPIC POSSIBLE OPEN CHOLECYSTECTOMY

## 2019-02-22 NOTE — BRIEF OPERATIVE NOTE - PROCEDURE
<<-----Click on this checkbox to enter Procedure Umbilical hernia repair  02/22/2019    Active  [facility]  Laparoscopic cholecystectomy  02/22/2019    Active  [facility]

## 2019-02-23 VITALS
RESPIRATION RATE: 18 BRPM | OXYGEN SATURATION: 94 % | DIASTOLIC BLOOD PRESSURE: 66 MMHG | TEMPERATURE: 98 F | SYSTOLIC BLOOD PRESSURE: 112 MMHG

## 2019-02-23 LAB
ALBUMIN SERPL ELPH-MCNC: 3.3 G/DL — SIGNIFICANT CHANGE UP (ref 3.3–5)
ALP SERPL-CCNC: 127 U/L — HIGH (ref 40–120)
ALT FLD-CCNC: 68 U/L — SIGNIFICANT CHANGE UP (ref 12–78)
AST SERPL-CCNC: 38 U/L — HIGH (ref 15–37)
BILIRUB DIRECT SERPL-MCNC: 0.2 MG/DL — SIGNIFICANT CHANGE UP (ref 0.05–0.2)
BILIRUB INDIRECT FLD-MCNC: 0.6 MG/DL — SIGNIFICANT CHANGE UP (ref 0.2–1)
BILIRUB SERPL-MCNC: 0.8 MG/DL — SIGNIFICANT CHANGE UP (ref 0.2–1.2)
PROT SERPL-MCNC: 6.6 G/DL — SIGNIFICANT CHANGE UP (ref 6–8.3)

## 2019-02-23 RX ORDER — HYDROMORPHONE HYDROCHLORIDE 2 MG/ML
0.5 INJECTION INTRAMUSCULAR; INTRAVENOUS; SUBCUTANEOUS ONCE
Qty: 0 | Refills: 0 | Status: DISCONTINUED | OUTPATIENT
Start: 2019-02-23 | End: 2019-02-23

## 2019-02-23 RX ORDER — OXYCODONE HYDROCHLORIDE 5 MG/1
1 TABLET ORAL
Qty: 10 | Refills: 0 | OUTPATIENT
Start: 2019-02-23

## 2019-02-23 RX ORDER — LEVOTHYROXINE SODIUM 125 MCG
0 TABLET ORAL
Qty: 0 | Refills: 0 | COMMUNITY

## 2019-02-23 RX ORDER — ACETAMINOPHEN 500 MG
2 TABLET ORAL
Qty: 0 | Refills: 0 | COMMUNITY

## 2019-02-23 RX ADMIN — ENOXAPARIN SODIUM 40 MILLIGRAM(S): 100 INJECTION SUBCUTANEOUS at 11:55

## 2019-02-23 RX ADMIN — Medication 25 MICROGRAM(S): at 05:55

## 2019-02-23 RX ADMIN — ONDANSETRON 4 MILLIGRAM(S): 8 TABLET, FILM COATED ORAL at 00:47

## 2019-02-23 RX ADMIN — HYDROMORPHONE HYDROCHLORIDE 0.5 MILLIGRAM(S): 2 INJECTION INTRAMUSCULAR; INTRAVENOUS; SUBCUTANEOUS at 00:39

## 2019-02-23 NOTE — CHART NOTE - NSCHARTNOTEFT_GEN_A_CORE
Called by RN that pt's was complaining of severe abd pain s/p acute cholecystectomy POD 0. Patient received 2 tab Percocet "that didn't touch me" at approximately 10:30pm. Patient has received IV Dilaudid 0.5mg for pain during this admission. Patient A&Ox4. Will give stat dose of Dilaudid 0.5mg x1. Plan to reassess pain after medication. Plan to recheck BP after medication is given. Will continue to follow, RN to call for any changes.     Vital Signs Last 24 Hrs  T(C): 37.4 (22 Feb 2019 23:28), Max: 37.4 (22 Feb 2019 23:28)  T(F): 99.3 (22 Feb 2019 23:28), Max: 99.3 (22 Feb 2019 23:28)  HR: 87 (22 Feb 2019 23:28) (65 - 93)  BP: 112/74 (22 Feb 2019 23:28) (102/66 - 136/80)  BP(mean): --  RR: 17 (22 Feb 2019 23:28) (11 - 22)  SpO2: 93% (22 Feb 2019 23:28) (90% - 100%)

## 2019-02-23 NOTE — PROGRESS NOTE ADULT - SUBJECTIVE AND OBJECTIVE BOX
INTERVAL HPI/OVERNIGHT EVENTS:  pt seen and examined  had pain overnight, now improved  no nausea     MEDICATIONS  (STANDING):  famotidine    Tablet 20 milliGRAM(s) Oral two times a day  lactated ringers. 1000 milliLiter(s) (100 mL/Hr) IV Continuous <Continuous>  levothyroxine 25 MICROGram(s) Oral daily  piperacillin/tazobactam IVPB. 3.375 Gram(s) IV Intermittent once  piperacillin/tazobactam IVPB. 3.375 Gram(s) IV Intermittent every 8 hours    MEDICATIONS  (PRN):  acetaminophen   Tablet .. 650 milliGRAM(s) Oral every 6 hours PRN Temp greater or equal to 38.5C (101.3F), Mild Pain (1 - 3)  HYDROmorphone  Injectable 0.5 milliGRAM(s) IV Push every 6 hours PRN Severe Pain (7 - 10)  ondansetron Injectable 4 milliGRAM(s) IV Push every 6 hours PRN Nausea      Allergies    No Known Allergies    Intolerances        Review of Systems:    General:  No wt loss, fevers, chills, night sweats, fatigue   Eyes:  Good vision, no reported pain  ENT:  No sore throat, pain, runny nose, dysphagia  CV:  No pain, palpitations, hypo/hypertension  Resp:  No dyspnea, cough, tachypnea, wheezing  GI:  +pain, No nausea, No vomiting, No diarrhea, No constipation, No weight loss, No fever, No pruritis, No rectal bleeding, No melena, No dysphagia  :  No pain, bleeding, incontinence, nocturia  Muscle:  No pain, weakness  Neuro:  headache  Psych:  No fatigue, insomnia, mood problems, depression  Endocrine:  No polyuria, polydypsia, cold/heat intolerance  Heme:  No petechiae, ecchymosis, easy bruisability  Skin:  No rash, tattoos, scars, edema      Vital Signs Last 24 Hrs  T(C): 36.6 (2019 08:25), Max: 36.6 (2019 17:42)  T(F): 97.9 (2019 08:25), Max: 97.9 (2019 00:22)  HR: 66 (2019 08:25) (66 - 68)  BP: 102/66 (2019 08:25) (97/63 - 124/80)  BP(mean): --  RR: 18 (2019 08:25) (18 - 18)  SpO2: 98% (2019 08:25) (98% - 99%)    PHYSICAL EXAM:  Constitutional: NAD  HEENT: ncat  Neck: No LAD  Respiratory: dec bs  Cardiovascular: S1 and S2, RRR  Gastrointestinal: soft mild ttp ruq no guarding nd  Extremities: No peripheral edema  Vascular: 2+ peripheral pulses  Neurological: awake alert responds appropriately  Skin: No rashes    LABS:                        12.4   6.86  )-----------( 281      ( 2019 07:10 )             37.7         144  |  107  |  7   ----------------------------<  97  3.9   |  31  |  0.61    Ca    9.0      2019 07:03    TPro  6.6  /  Alb  3.2<L>  /  TBili  0.9  /  DBili  .20  /  AST  38<H>  /  ALT  72  /  AlkPhos  136<H>      PT/INR - ( 2019 15:00 )   PT: 12.0 sec;   INR: 1.05 ratio         PTT - ( 2019 15:00 )  PTT:31.5 sec  Urinalysis Basic - ( 2019 20:41 )    Color: Yellow / Appearance: Clear / S.015 / pH: x  Gluc: x / Ketone: Negative  / Bili: Negative / Urobili: Negative   Blood: x / Protein: Negative / Nitrite: Negative   Leuk Esterase: Negative / RBC: 0-2 /HPF / WBC 0-2   Sq Epi: x / Non Sq Epi: Occasional / Bacteria: Occasional        RADIOLOGY & ADDITIONAL TESTS:  < from: MR MRCP w/wo IV Cont (19 @ 11:02) >    EXAM:  MR MRCP [facility] IC                            PROCEDURE DATE:  2019          INTERPRETATION:  History: Gallstones, elevated LFTs.      MRCP and abdominal MR multisequence. No exogenous contrast.    Gallbladder distended with a large stone.No wall thickening or   pericholecystic fluid. Common duct normal in caliber, measures 5 mm. No   common duct stone or stricture. No intrahepatic biliary dilatation.   Unenhanced liver spleen not remarkable. Unenhanced pancreas unremarkable.   No pancreatic ductal dilatation. No scan evidence of acute pancreatitis.   There is a tiny subcentimeter right renal cortical cyst.  No ascites.    Impression:    Cholelithiasis. No biliary dilatation or common duct stone.                VITO CROCKER M.D., ATTENDING RADIOLOGIST  This document has been electronically signed. 2019 11:33AM                < end of copied text >

## 2019-02-23 NOTE — PROGRESS NOTE ADULT - SUBJECTIVE AND OBJECTIVE BOX
CONI SORENSEN  MRN-[id number] 48y    GENERAL SURGERY/ DR. JOHNSON     NO FEVER, CHILLS, N/V, TOLERATING REGULAR DIET     Vital Signs Last 24 Hrs  T(C): 36.8 (23 Feb 2019 08:14), Max: 37.4 (22 Feb 2019 23:28)  T(F): 98.2 (23 Feb 2019 08:14), Max: 99.3 (22 Feb 2019 23:28)  HR: 90 (23 Feb 2019 04:15) (65 - 90)  BP: 112/66 (23 Feb 2019 08:14) (103/67 - 136/80)  BP(mean): --  RR: 18 (23 Feb 2019 08:14) (11 - 22)  SpO2: 94% (23 Feb 2019 08:14) (90% - 100%)    POD # 1      LUNGS :CLEAR TO AUSCULTATION , NO W/R/R  ABDOMEN: UMBILICAL WOUND  AND ALL TROCAR SITES ARE DRY AND INTACT WITH MILD ECCHYMOSIS AT THE UMBILICUS, + BS, SOFT, NON DISTENDED, SOME INCISIONAL TENDERNESS   EXTREMITY: NO EDEMA, NO CALF TENDERNESS                                ASSESSMENT &  PLAN:  POD # 1 S/P LAPAROSCOPIC CHOLECYSTECTOMY AND UMBILICAL HERNIA REPAIR    STABLE  D/C HOME  FOLLOW UP WITH DR. JOHNSON IN ONE WEEK

## 2019-02-23 NOTE — PROGRESS NOTE ADULT - REASON FOR ADMISSION
RUQ abdominal pain radiating to the back

## 2019-02-23 NOTE — PROGRESS NOTE ADULT - SUBJECTIVE AND OBJECTIVE BOX
The patient was interviewed and evaluated  48y Female    T(C): 36.8 (02-23-19 @ 08:14), Max: 37.4 (02-22-19 @ 23:28)  HR: 90 (02-23-19 @ 04:15) (65 - 90)  BP: 112/66 (02-23-19 @ 08:14) (103/67 - 123/67)  RR: 18 (02-23-19 @ 08:14) (11 - 22)  SpO2: 94% (02-23-19 @ 08:14) (90% - 100%)  Wt(kg): --    Pt seen, doing well, no anesthesia complications or complaints noted or reported.   Nausea/vomiting yesterday, resolved    All questions answered    No additional recommendations.     Pain well controlled

## 2019-02-28 ENCOUNTER — APPOINTMENT (OUTPATIENT)
Dept: SURGERY | Facility: CLINIC | Age: 49
End: 2019-02-28
Payer: MEDICAID

## 2019-02-28 VITALS
DIASTOLIC BLOOD PRESSURE: 85 MMHG | WEIGHT: 155 LBS | OXYGEN SATURATION: 97 % | BODY MASS INDEX: 31.25 KG/M2 | HEIGHT: 59 IN | RESPIRATION RATE: 14 BRPM | SYSTOLIC BLOOD PRESSURE: 118 MMHG | HEART RATE: 88 BPM

## 2019-02-28 PROCEDURE — 99024 POSTOP FOLLOW-UP VISIT: CPT

## 2019-03-04 PROCEDURE — 80074 ACUTE HEPATITIS PANEL: CPT

## 2019-03-04 PROCEDURE — 74183 MRI ABD W/O CNTR FLWD CNTR: CPT

## 2019-03-04 PROCEDURE — 82248 BILIRUBIN DIRECT: CPT

## 2019-03-04 PROCEDURE — 85610 PROTHROMBIN TIME: CPT

## 2019-03-04 PROCEDURE — 86900 BLOOD TYPING SEROLOGIC ABO: CPT

## 2019-03-04 PROCEDURE — 80076 HEPATIC FUNCTION PANEL: CPT

## 2019-03-04 PROCEDURE — 85027 COMPLETE CBC AUTOMATED: CPT

## 2019-03-04 PROCEDURE — 74176 CT ABD & PELVIS W/O CONTRAST: CPT

## 2019-03-04 PROCEDURE — 36415 COLL VENOUS BLD VENIPUNCTURE: CPT

## 2019-03-04 PROCEDURE — 76705 ECHO EXAM OF ABDOMEN: CPT

## 2019-03-04 PROCEDURE — 93005 ELECTROCARDIOGRAM TRACING: CPT

## 2019-03-04 PROCEDURE — 81025 URINE PREGNANCY TEST: CPT

## 2019-03-04 PROCEDURE — 85730 THROMBOPLASTIN TIME PARTIAL: CPT

## 2019-03-04 PROCEDURE — 81001 URINALYSIS AUTO W/SCOPE: CPT

## 2019-03-04 PROCEDURE — 83690 ASSAY OF LIPASE: CPT

## 2019-03-04 PROCEDURE — 87086 URINE CULTURE/COLONY COUNT: CPT

## 2019-03-04 PROCEDURE — A9579: CPT

## 2019-03-04 PROCEDURE — 99285 EMERGENCY DEPT VISIT HI MDM: CPT | Mod: 25

## 2019-03-04 PROCEDURE — 88304 TISSUE EXAM BY PATHOLOGIST: CPT

## 2019-03-04 PROCEDURE — 80053 COMPREHEN METABOLIC PANEL: CPT

## 2019-03-04 PROCEDURE — 86901 BLOOD TYPING SEROLOGIC RH(D): CPT

## 2019-03-04 PROCEDURE — 82150 ASSAY OF AMYLASE: CPT

## 2019-03-04 PROCEDURE — 86850 RBC ANTIBODY SCREEN: CPT

## 2020-06-25 NOTE — DISCHARGE NOTE ADULT - NURSING SECTION COMPLETE
impairments found/rehab potential/anticipated discharge recommendation Patient/Caregiver provided printed discharge information.

## 2021-02-12 NOTE — ED ADULT NURSE NOTE - TEMPLATE
Also with 2 second pause noted earlier in hospital stay  · Lopressor held  · Asymptomatic    · Appreciate cardiology recommendations, possible Zio patch as outpatient Orthopedic

## 2021-06-23 NOTE — ED ADULT NURSE NOTE - NSSISCREENINGQ2_ED_A_ED
Recommend further testing and evaluation with retinal specialist. Recommend clearance prior to cataract surgery. No

## 2021-12-01 NOTE — ED PROVIDER NOTE - CARDIAC, MLM
Normal rate, regular rhythm.  Heart sounds S1, S2.  No murmurs, rubs or gallops. Thalidomide Counseling: I discussed with the patient the risks of thalidomide including but not limited to birth defects, anxiety, weakness, chest pain, dizziness, cough and severe allergy.

## 2022-02-07 NOTE — ED PROVIDER NOTE - CPE EDP EYES NORM
Left msg for pt, pt will need to contact his insurance for a list of pain management doctors
normal...
This document is complete and the patient is ready for discharge.

## 2022-06-14 NOTE — ED ADULT NURSE NOTE - BREATHING, MLM
I want you to follow-up with your PCP as needed.  I do recommend getting a second flu test in 24 hours now just make sure that you truly are negative with influenza.  I want to take 1000 mg of Tylenol 3 times a day.  You can take 800 milligrams of Motrin 3 times a day.  You can take the Flexeril 10 mg up to 3 times a day as needed.  Come back to emergency room if you have worsening symptoms, uncontrolled fever or any other concerns.  Thank you for coming in today.  
Spontaneous, unlabored and symmetrical

## 2023-12-23 NOTE — ED ADULT NURSE NOTE - NSFALLRSKINDICATORS_ED_ALL_ED
no You can access the FollowMyHealth Patient Portal offered by [facility] by registering at the following website: [email or web address]. By joining [facility]’s FollowMyHealth portal, you will also be able to view your health information using other applications (apps) compatible with our system. You can access the FollowMyHealth Patient Portal offered by [facility] by registering at the following website: [email or web address]. By joining [facility]’s FollowMyHealth portal, you will also be able to view your health information using other applications (apps) compatible with our system.